# Patient Record
(demographics unavailable — no encounter records)

---

## 2024-10-10 NOTE — HISTORY OF PRESENT ILLNESS
[FreeTextEntry1] : 61 year old man seen 10/10/2024 with complaint of urinary frequency and nocturia and PSA elevation. Patient was sent by his PCP Dr. Damon for PSA elevation of 3.65 in 9/2024 from 1.59 in 9/2022, his prior PSAs have been around 1.5 to 1.8.  Patient reports he notes increased urinary frequency and nocturia x 4-5 over the past few years. He also notes occasional left sided back pain for many months which is worse when he lays down at night. Denies hx of kidney stones. It is moderate in severity. Nothing makes the symptoms better, nothing makes sx worse.  It is associated with nothing.  wbc 8.99, Cr 1.19 No hematuria, no dysuria, no hesitancy, no straining. No incontinence.  No fevers, no chills, no nausea, no vomiting, no flank pain.   Reports his brother was recently diagnosed with prostate cancer.

## 2024-10-10 NOTE — ASSESSMENT
[FreeTextEntry1] : 60 yo male with BPH with LUTS, PSA elevation and right sided back pain. For PSA elevation- Discussed with patient that PSA is imprecise test. PSA can be elevated due to benign prostate enlargement, prostate stimulation, sexual activity, urinary tract infection, prostatitis, as well as prostate cancer. There is no value for PSA which is diagnostic for prostate cancer, nor is there value which conclusively excludes prostate cancer. PSA simply stratifies risk for prostate cancer and diagnosis of prostate cancer requires prostate biopsy. Discussed with patient a repeat PSA is recommended, if elevated would recommend MRI. Will plan for fusion biopsy if MRI concerning for any lesions. For BPH- The anatomy of the lower genitourinary tract was explained to the patient, with the urine passing through the bladder neck and prostatic urethra as it exits the body. Prostatic enlargement can constrict the lumen and the bladder outlet, causing incomplete bladder emptying and irritative symptoms such and frequency and urgency. Alpha blockers can be used to relax the bladder neck and facilitate passage of urine from the bladder. 5-alpha reductase inhibitors can be employed to shrink the prostate and thereby allow for passage of urine more easily. Anticholinergics to decrease bladder irritative symptoms were also discussed, but it was stressed that they can increase post void residual and risk urinary retention. Patient would like to try Tamsulosin. - UA/UCx - Tamsulosin 0.4 mg QHS - RBUS to R/O stones, hydronephrosis and asses PVR - PSA in 3 months - RTO in 3 months for symptom check or sooner PRN

## 2024-10-10 NOTE — PHYSICAL EXAM
[Normal Appearance] : normal appearance [Well Groomed] : well groomed [Edema] : no peripheral edema [General Appearance - In No Acute Distress] : no acute distress [Respiration, Rhythm And Depth] : normal respiratory rhythm and effort [Exaggerated Use Of Accessory Muscles For Inspiration] : no accessory muscle use [Abdomen Soft] : soft [Abdomen Tenderness] : non-tender [Costovertebral Angle Tenderness] : no ~M costovertebral angle tenderness [Urinary Bladder Findings] : the bladder was normal on palpation [Normal Station and Gait] : the gait and station were normal for the patient's age [] : no rash [No Focal Deficits] : no focal deficits [Affect] : the affect was normal [Oriented To Time, Place, And Person] : oriented to person, place, and time [Mood] : the mood was normal [No Palpable Adenopathy] : no palpable adenopathy

## 2025-03-13 NOTE — ASSESSMENT
[FreeTextEntry1] : 61-year-old male with BPH with LUTS. Nocturia well managed on tamsulosin 0.4 mg QhS. Will continue.  For PSA Elevation, Discussed PSA screening and latest recommendations/guidelines: USPTF and AUA. Due to recent elevation in PSA and positive family history recommend MRI for fusion biopsy planning.   Will inform results.

## 2025-03-13 NOTE — HISTORY OF PRESENT ILLNESS
[FreeTextEntry1] : 61 year old man seen 10/10/2024 with complaint of urinary frequency and nocturia and PSA elevation. Patient was sent by his PCP Dr. Damon for PSA elevation of 3.65 in 9/2024 from 1.59 in 9/2022, his prior PSAs have been around 1.5 to 1.8.  Patient reports he notes increased urinary frequency and nocturia x 4-5 over the past few years. He also notes occasional left sided back pain for many months which is worse when he lays down at night. Denies hx of kidney stones. It is moderate in severity. Nothing makes the symptoms better, nothing makes sx worse.  It is associated with nothing.  wbc 8.99, Cr 1.19 No hematuria, no dysuria, no hesitancy, no straining. No incontinence.  No fevers, no chills, no nausea, no vomiting, no flank pain.   Reports his brother was recently diagnosed with prostate cancer.  03/13/25: Patient presents for follow up. Taking tamsulosin 0.4mg reports good stream and nocturia reduced to 1 x. RBUS (10/14/24) showed no stones or hydronephrosis. Prostate volume 26ml and PVR 5ml. Recent PSA increased to 4.01 (03/10/25).  Denies dysuria, hematuria, lower abdominal or flank pain, fever, chills or rigors. Denies hesitancy, straining, intermittency, urgency, incontinence or sense of incomplete emptying.